# Patient Record
Sex: FEMALE | Race: WHITE
[De-identification: names, ages, dates, MRNs, and addresses within clinical notes are randomized per-mention and may not be internally consistent; named-entity substitution may affect disease eponyms.]

---

## 2017-06-26 ENCOUNTER — HOSPITAL ENCOUNTER (OUTPATIENT)
Dept: HOSPITAL 82 - ED | Age: 67
Setting detail: OBSERVATION
LOS: 1 days | Discharge: HOME | End: 2017-06-27
Attending: INTERNAL MEDICINE | Admitting: INTERNAL MEDICINE
Payer: MEDICARE

## 2017-06-26 VITALS — WEIGHT: 240.94 LBS | BODY MASS INDEX: 45.49 KG/M2 | HEIGHT: 61 IN

## 2017-06-26 DIAGNOSIS — Z87.891: ICD-10-CM

## 2017-06-26 DIAGNOSIS — E78.5: ICD-10-CM

## 2017-06-26 DIAGNOSIS — Z79.84: ICD-10-CM

## 2017-06-26 DIAGNOSIS — N39.0: Primary | ICD-10-CM

## 2017-06-26 DIAGNOSIS — I10: ICD-10-CM

## 2017-06-26 DIAGNOSIS — N17.9: ICD-10-CM

## 2017-06-26 DIAGNOSIS — E11.9: ICD-10-CM

## 2017-06-26 DIAGNOSIS — E86.0: ICD-10-CM

## 2017-06-26 LAB
ALBUMIN SERPL-MCNC: 4.7 G/DL (ref 3.2–5)
ALP SERPL-CCNC: 92 U/L (ref 38–126)
ALT SERPL-CCNC: 30 U/L (ref 11–66)
ANION GAP SERPL CALCULATED.3IONS-SCNC: 20 MMOL/L
AST SERPL-CCNC: 20 U/L (ref 9–36)
BACTERIA #/AREA URNS HPF: (no result) HPF
BASOPHILS NFR BLD AUTO: 0 % (ref 0–3)
BILIRUB UR QL STRIP.AUTO: NEGATIVE
BUN SERPL-MCNC: 43 MG/DL (ref 8–23)
BUN/CREAT SERPL: 32
CALCIUM SERPL-MCNC: 9.7 MG/DL (ref 8.4–10.2)
CHLORIDE SERPL-SCNC: 105 MMOL/L (ref 95–108)
CLARITY UR: (no result)
CO2 SERPL-SCNC: 24 MMOL/L (ref 22–30)
COLOR UR AUTO: YELLOW
CREAT SERPL-MCNC: 1.3 MG/DL (ref 0.5–1)
EOSINOPHIL NFR BLD AUTO: 2 % (ref 0–8)
ERYTHROCYTE [DISTWIDTH] IN BLOOD BY AUTOMATED COUNT: 14.7 % (ref 11.5–15.5)
FLUBV AG SPEC QL IA: (no result)
GLUCOSE SERPL-MCNC: 143 MG/DL (ref 82–115)
GLUCOSE UR STRIP.AUTO-MCNC: NEGATIVE MG/DL
HAV IGM SERPL QL IA: (no result)
HCT VFR BLD AUTO: 37.5 % (ref 37–47)
HGB BLD-MCNC: 12.2 G/DL (ref 12–16)
HGB UR QL STRIP.AUTO: (no result)
IMM GRANULOCYTES NFR BLD: 0.6 % (ref 0–1)
KETONES UR STRIP.AUTO-MCNC: NEGATIVE MG/DL
LEUKOCYTE ESTERASE UR QL STRIP.AUTO: (no result)
LYMPHOCYTES NFR BLD: 15 % (ref 15–41)
MCH RBC QN AUTO: 28.6 PG  CALC (ref 26–32)
MCHC RBC AUTO-ENTMCNC: 32.5 G/L CALC (ref 32–36)
MCV RBC AUTO: 87.8 FL  CALC (ref 80–100)
MONOCYTES NFR BLD AUTO: 4 % (ref 2–13)
NEUTROPHILS # BLD AUTO: 14.89 THOU/UL (ref 2–7.15)
NEUTROPHILS NFR BLD AUTO: 79 % (ref 42–76)
NITRITE UR QL STRIP.AUTO: NEGATIVE
PH UR STRIP.AUTO: 5.5 [PH] (ref 4.5–8)
PLATELET # BLD AUTO: 260 THOU/UL (ref 130–400)
POTASSIUM SERPL-SCNC: 4 MMOL/L (ref 3.5–5.1)
PROT SERPL-MCNC: 8.4 G/DL (ref 6.3–8.2)
PROT UR QL STRIP.AUTO: 30 MG/DL
RBC # BLD AUTO: 4.27 MILL/UL (ref 4.2–5.6)
RBC #/AREA URNS HPF: (no result) RBC/HPF (ref 0–5)
SODIUM SERPL-SCNC: 144 MMOL/L (ref 137–146)
SP GR UR STRIP.AUTO: 1.02
SQUAMOUS URNS QL MICRO: (no result) EPI/HPF
UROBILINOGEN UR QL STRIP.AUTO: 0.2 E.U./DL
WBC #/AREA URNS HPF: (no result) WBC/HPF (ref 0–5)

## 2017-06-27 VITALS — SYSTOLIC BLOOD PRESSURE: 151 MMHG | DIASTOLIC BLOOD PRESSURE: 58 MMHG

## 2017-06-27 VITALS — DIASTOLIC BLOOD PRESSURE: 64 MMHG | SYSTOLIC BLOOD PRESSURE: 119 MMHG

## 2017-06-27 VITALS — DIASTOLIC BLOOD PRESSURE: 59 MMHG | SYSTOLIC BLOOD PRESSURE: 126 MMHG

## 2017-06-27 VITALS — SYSTOLIC BLOOD PRESSURE: 144 MMHG | DIASTOLIC BLOOD PRESSURE: 56 MMHG

## 2017-06-27 LAB
ANION GAP SERPL CALCULATED.3IONS-SCNC: 18 MMOL/L
BARBITURATES UR-MCNC: NEGATIVE UG/ML
BASOPHILS NFR BLD AUTO: 0 % (ref 0–3)
BUN SERPL-MCNC: 32 MG/DL (ref 8–23)
BUN/CREAT SERPL: 29
CALCIUM SERPL-MCNC: 8.7 MG/DL (ref 8.4–10.2)
CHLORIDE SERPL-SCNC: 106 MMOL/L (ref 95–108)
CO2 SERPL-SCNC: 22 MMOL/L (ref 22–30)
COCAINE UR-MCNC: NEGATIVE NG/ML
CREAT SERPL-MCNC: 1.1 MG/DL (ref 0.5–1)
EOSINOPHIL NFR BLD AUTO: 1 % (ref 0–8)
ERYTHROCYTE [DISTWIDTH] IN BLOOD BY AUTOMATED COUNT: 14.6 % (ref 11.5–15.5)
GLUCOSE SERPL-MCNC: 208 MG/DL (ref 82–115)
HCT VFR BLD AUTO: 31.9 % (ref 37–47)
HGB BLD-MCNC: 10.4 G/DL (ref 12–16)
IMM GRANULOCYTES NFR BLD: 0.6 % (ref 0–1)
LYMPHOCYTES NFR BLD: 14 % (ref 15–41)
MCH RBC QN AUTO: 28.5 PG  CALC (ref 26–32)
MCHC RBC AUTO-ENTMCNC: 32.6 G/L CALC (ref 32–36)
MCV RBC AUTO: 87.4 FL  CALC (ref 80–100)
METHADONE SERPL-MCNC: NEGATIVE NG/ML
MONOCYTES NFR BLD AUTO: 4 % (ref 2–13)
NEUTROPHILS # BLD AUTO: 15.72 THOU/UL (ref 2–7.15)
NEUTROPHILS NFR BLD AUTO: 81 % (ref 42–76)
OXCYCODONE: NEGATIVE
PLATELET # BLD AUTO: 222 THOU/UL (ref 130–400)
POTASSIUM SERPL-SCNC: 4.3 MMOL/L (ref 3.5–5.1)
RBC # BLD AUTO: 3.65 MILL/UL (ref 4.2–5.6)
SODIUM SERPL-SCNC: 141 MMOL/L (ref 137–146)
TETRAHYDROCANNABIONOL: NEGATIVE
TRICYLIC ANTIDEPRESSANTS: NEGATIVE

## 2019-01-12 ENCOUNTER — HOSPITAL ENCOUNTER (OUTPATIENT)
Dept: HOSPITAL 82 - ED | Age: 69
Setting detail: OBSERVATION
LOS: 1 days | Discharge: HOME | End: 2019-01-13
Attending: INTERNAL MEDICINE | Admitting: INTERNAL MEDICINE
Payer: MEDICARE

## 2019-01-12 VITALS — DIASTOLIC BLOOD PRESSURE: 56 MMHG | SYSTOLIC BLOOD PRESSURE: 150 MMHG

## 2019-01-12 VITALS — SYSTOLIC BLOOD PRESSURE: 158 MMHG | DIASTOLIC BLOOD PRESSURE: 49 MMHG

## 2019-01-12 VITALS — WEIGHT: 240.3 LBS | HEIGHT: 63 IN | BODY MASS INDEX: 42.58 KG/M2

## 2019-01-12 VITALS — DIASTOLIC BLOOD PRESSURE: 57 MMHG | SYSTOLIC BLOOD PRESSURE: 137 MMHG

## 2019-01-12 DIAGNOSIS — Z79.84: ICD-10-CM

## 2019-01-12 DIAGNOSIS — E78.5: ICD-10-CM

## 2019-01-12 DIAGNOSIS — E66.9: ICD-10-CM

## 2019-01-12 DIAGNOSIS — E11.22: ICD-10-CM

## 2019-01-12 DIAGNOSIS — N39.0: ICD-10-CM

## 2019-01-12 DIAGNOSIS — I12.9: ICD-10-CM

## 2019-01-12 DIAGNOSIS — N18.3: ICD-10-CM

## 2019-01-12 DIAGNOSIS — Z87.891: ICD-10-CM

## 2019-01-12 DIAGNOSIS — R07.89: Primary | ICD-10-CM

## 2019-01-12 LAB
ANION GAP SERPL CALCULATED.3IONS-SCNC: 18 MMOL/L
BACTERIA #/AREA URNS HPF: (no result) HPF
BASOPHILS NFR BLD AUTO: 0 % (ref 0–3)
BILIRUB UR QL STRIP.AUTO: NEGATIVE
BUN SERPL-MCNC: 34 MG/DL (ref 8–23)
BUN/CREAT SERPL: 28
CHLORIDE SERPL-SCNC: 105 MMOL/L (ref 95–108)
CHOLEST SERPL-MCNC: 200 MG/DL (ref 0–199)
CHOLEST/HDLC SERPL: 5.5 {RATIO}
CLARITY UR: (no result)
CO2 SERPL-SCNC: 22 MMOL/L (ref 22–30)
COLOR UR AUTO: YELLOW
CREAT SERPL-MCNC: 1.2 MG/DL (ref 0.5–1)
EOSINOPHIL NFR BLD AUTO: 2 % (ref 0–8)
ERYTHROCYTE [DISTWIDTH] IN BLOOD BY AUTOMATED COUNT: 14.1 % (ref 11.5–15.5)
GLUCOSE UR STRIP.AUTO-MCNC: NEGATIVE MG/DL
HCT VFR BLD AUTO: 36.9 % (ref 37–47)
HDLC SERPL-MCNC: 37 MG/DL (ref 40–?)
HGB BLD-MCNC: 12 G/DL (ref 12–16)
HGB UR QL STRIP.AUTO: (no result)
IMM GRANULOCYTES NFR BLD: 0.5 % (ref 0–5)
KETONES UR STRIP.AUTO-MCNC: NEGATIVE MG/DL
LDLC SERPL CALC-MCNC: 110 MG/DL
LEUKOCYTE ESTERASE UR QL STRIP.AUTO: (no result)
LYMPHOCYTES NFR BLD: 25 % (ref 15–41)
MAGNESIUM SERPL-MCNC: 1.7 MG/DL (ref 1.6–2.3)
MCH RBC QN AUTO: 29.5 PG  CALC (ref 26–32)
MCHC RBC AUTO-ENTMCNC: 32.5 G/L CALC (ref 32–36)
MCV RBC AUTO: 90.7 FL  CALC (ref 80–100)
MONOCYTES NFR BLD AUTO: 6 % (ref 2–13)
NEUTROPHILS # BLD AUTO: 8.34 THOU/UL (ref 2–7.15)
NEUTROPHILS NFR BLD AUTO: 67 % (ref 42–76)
NITRITE UR QL STRIP.AUTO: NEGATIVE
PH UR STRIP.AUTO: 5.5 [PH] (ref 4.5–8)
PLATELET # BLD AUTO: 269 THOU/UL (ref 130–400)
POTASSIUM SERPL-SCNC: 4.1 MMOL/L (ref 3.5–5.1)
PROT UR STRIP.AUTO-MCNC: (no result) MG/DL
RBC # BLD AUTO: 4.07 MILL/UL (ref 4.2–5.6)
RBC #/AREA URNS HPF: (no result) RBC/HPF (ref 0–5)
SODIUM SERPL-SCNC: 141 MMOL/L (ref 137–146)
SP GR UR STRIP.AUTO: <=1.005
SQUAMOUS URNS QL MICRO: (no result) EPI/HPF
TRIGL SERPL-MCNC: 266 MG/DL (ref 30–149)
UROBILINOGEN UR QL STRIP.AUTO: 0.2 E.U./DL
VLDLC SERPL CALC-MCNC: 53 MG/DL

## 2019-01-12 NOTE — NUR
ASSESSMENT IS COMPLETED:  IV SITE IS FREE FROM REDNESS OR EDEMA. HR IS REG,
PULSES ARE STRONG X4, ABD IS SOFT WITH ACTIVE BS. BREATH SOUNDS ARE CLEAR,
BILATERALLY, NO C/O SOB, TELE MONITOR IN PLACE. CONTINUE TO OSBERVE AND
MONITOR,

## 2019-01-12 NOTE — NUR
Admission Note
 
 
Report Given to:         DOMINICK LPN
Transported by:           Wheelchair          X Stretcher
 
Transported with:        X Nurse     Transporter   X Patent IV    O2
                         X Cardiac Monitor
 
 
 
 
          
 
         [place]SP[place] [facility] [place]

## 2019-01-12 NOTE — NUR
PT SITTING UP IN RECLINER VISITING WITH FAMILIES; AGREED TO TAKE LOVENOX SHOT;
TOLERATED WELL; RESP EVEN AND UNLABRED; TELE IN PLACE; PT AMBULATORY IN ROOM;
SAFETY PRECAUTION REINFORCE; CALL MELARA IN REACH.

## 2019-01-12 NOTE — NUR
PT STATES THAT SHE HAS OFF AND ON CHEST DISCOMFORT THE PAST WEEK AFTER STARTING
A NEW MEDICATION- JANUVIA. PT DENIES ANY C/P AT THIS TIME. PT IS AOX4. PT
DENIES ANY SOB, N/V OR WEAKNESS. 
 
PT STATES DISCOMFORT COMES ON IN THE MORNING AND IS GONE IN THE AFTERNOON, HAS
REOCCURED FOR THE PAST WEEK.

## 2019-01-12 NOTE — NUR
PT ARRIEVED ON FLOOR VIA W/C ACCOMPANIED BY ERICKA RN ER STAFF; PT AMBULATED
TO DIGITAL STANDING SCALE WITH STEADY GAIT; PT VOIDED CLEAR, YELLOW URINE; PT
ORIENT TO ROOM AND CALL MELARA SYSTEM; PT REQUEST RECLINER WHICH WAS PROVIDED;
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
PLACE; CALL BELL IN REACH.

## 2019-01-13 VITALS — DIASTOLIC BLOOD PRESSURE: 57 MMHG | SYSTOLIC BLOOD PRESSURE: 100 MMHG

## 2019-01-13 VITALS — SYSTOLIC BLOOD PRESSURE: 133 MMHG | DIASTOLIC BLOOD PRESSURE: 58 MMHG

## 2019-01-13 VITALS — SYSTOLIC BLOOD PRESSURE: 106 MMHG | DIASTOLIC BLOOD PRESSURE: 42 MMHG

## 2019-01-13 VITALS — DIASTOLIC BLOOD PRESSURE: 65 MMHG | SYSTOLIC BLOOD PRESSURE: 143 MMHG

## 2019-01-13 LAB
ALBUMIN SERPL-MCNC: 3.5 G/DL (ref 3.2–5)
ALP SERPL-CCNC: 69 U/L (ref 38–126)
AMYLASE SERPL-CCNC: 89 U/L (ref 30–110)
ANION GAP SERPL CALCULATED.3IONS-SCNC: 15 MMOL/L
AST SERPL-CCNC: 21 U/L (ref 9–36)
BASOPHILS NFR BLD AUTO: 1 % (ref 0–3)
BUN SERPL-MCNC: 33 MG/DL (ref 8–23)
BUN/CREAT SERPL: 25
CHLORIDE SERPL-SCNC: 109 MMOL/L (ref 95–108)
CO2 SERPL-SCNC: 21 MMOL/L (ref 22–30)
CREAT SERPL-MCNC: 1.3 MG/DL (ref 0.5–1)
EOSINOPHIL NFR BLD AUTO: 2 % (ref 0–8)
ERYTHROCYTE [DISTWIDTH] IN BLOOD BY AUTOMATED COUNT: 14 % (ref 11.5–15.5)
HCT VFR BLD AUTO: 32.6 % (ref 37–47)
HGB BLD-MCNC: 10.4 G/DL (ref 12–16)
IMM GRANULOCYTES NFR BLD: 0.3 % (ref 0–5)
LIPASE SERPL-CCNC: 272 U/L (ref 23–300)
LYMPHOCYTES NFR BLD: 24 % (ref 15–41)
MAGNESIUM SERPL-MCNC: 1.7 MG/DL (ref 1.6–2.3)
MCH RBC QN AUTO: 28.7 PG  CALC (ref 26–32)
MCHC RBC AUTO-ENTMCNC: 31.9 G/L CALC (ref 32–36)
MCV RBC AUTO: 90.1 FL  CALC (ref 80–100)
MONOCYTES NFR BLD AUTO: 7 % (ref 2–13)
NEUTROPHILS # BLD AUTO: 5.71 THOU/UL (ref 2–7.15)
NEUTROPHILS NFR BLD AUTO: 66 % (ref 42–76)
PLATELET # BLD AUTO: 204 THOU/UL (ref 130–400)
POTASSIUM SERPL-SCNC: 4.3 MMOL/L (ref 3.5–5.1)
PROT SERPL-MCNC: 6.5 G/DL (ref 6.3–8.2)
RBC # BLD AUTO: 3.62 MILL/UL (ref 4.2–5.6)
SODIUM SERPL-SCNC: 142 MMOL/L (ref 137–146)

## 2019-01-13 NOTE — NUR
PT HAS BEEN RESTING IN BED WITH NO DISTRESS NOTED IV SITE IS FREE FROM REDNESS
OR EDEMA. TELE MONITOR IN PLACE CONTINUE TO OBSERVE AND MONITOR

## 2019-01-13 NOTE — NUR
Discharge instructions given. Patient verbalizes understanding of same.
Discharged in stable condition via Wheelchair to Home with
*Other. All belongings sent with pt.
Pt wheelchaired to lobby accompanied by mel french;pt to drive self home.

## 2019-01-13 NOTE — NUR
PT OOB RESTING IN RECLINER;RESPIRATIONS EVEN AND UNLABORED ON RA;PT CONTINUES
TO DENY ANY CHEST PAIN;TELE MONITORING IN PLACE;IV SITE TO LAC REMAINS
PATENT;ACCUCHECK 142, NO COVERAGE NEEDED AT THIS TIME;ASSESSMENT REMAINS
UNCHANGED;ENCOURAGED TO CALL FOR ASSISTANCE IF NEEDED;CALL LIGHT IN
REACH;;WILL CONTINUE TO MONITOR

## 2019-01-13 NOTE — NUR
PT IS RESTING IN BED WITH NO DISTRESS NOTED. IV SITE IS FREE FROM REDNESS OR
EDEMA. CONTINUE TO OSBERVE AND MONITOR.

## 2019-01-13 NOTE — NUR
REPORT RECEIVED FROM ADONIS BLACK;PT OOB RESTING IN RECLINER;INTRODUCED SELF TO PT
AND POC DISCUSSED;PT DENIES ANY CURRENT PAIN OR NEEDS,PAIN SCALE AND REPORTING
EDUCATED;RESPIRATIONS EVEN AND UNLABORED AT THIS TIME;TELE MONITORING IN
PLACE;PT ENCOURAGED TO CALL FOR ASSISTANCE IF NEEDED;FALL PRECAUTIONS IN PLACE
WITH CALL LIGHT IN REACH;WILL CONTINUE TO MONITOR

## 2019-01-13 NOTE — NUR
INQUIRED WITH PT RE: QUESTION WITH MRSA. STATES" WHEN I WENT TO  AT [facility], BOIL ON THE NECK THAT WAS NOT TESTED , SHE TOLD ME I HAD MRSA".

## 2019-01-13 NOTE — NUR
ALL DISCHARGE INSTRUCTIONS PROVIDED AT THIS TIME;PRESCRIPTIONS FOR KEFLEX AND
LIPITOR DISCUSSED IN DEPTH AND PROVIDED FOR DISCHARGE;ALL QUESTIONS ANSWERED
AT THIS TIME;IV SITE REMOVED WITH CATHETER INTACT;PT DENIES ANY ADDITIONAL
NEEDS AT THIS TIME;WHEELCHAIR TO BE PROVIDED FOR DISCHARGE HOME.

## 2019-01-13 NOTE — NUR
PT OOB RESTING IN RECLINER;VS OBTAINED AND ASSESSMENT COMPLETED;PT DENIES ANY
CURRENT CHEST PAIN OR DISCOMFORTS,PAIN SCALE AND REPORTING
EDUCATED;RESPIRATIONS EVEN AND UNLABORED ON RA,CLEAR LUNG SOUNDS NOTED;ABDOMEN
SOFT ON PALPATION AND ACTIVE IN ALL 4 QUADRANTS;STRONG PEDAL PULSES;SKIN
INTACT;TELE MONITORING IN PLACE;#20G TO LAC FLUSHED AND PATENT,SITE APPEARS
HEALTHY;ACCUCHECK 138,NO COVERAGE NEEDED THIS MORNING;PT DENIES ANY ADDITIONAL
NEEDS AT THIS TIME AND IS ENCOURAGED TO CALL FOR ASSISTANCE IF NEEDED;FALL
PRECAUTIONS IN PLACE WITH CALL LIGHT IN REACH;WILL CONTINUE TO MONITOR

## 2021-06-03 ENCOUNTER — HOSPITAL ENCOUNTER (EMERGENCY)
Dept: HOSPITAL 82 - ED | Age: 71
Discharge: HOME | End: 2021-06-03
Payer: MEDICARE

## 2021-06-03 VITALS — WEIGHT: 242.51 LBS | HEIGHT: 63 IN | BODY MASS INDEX: 42.97 KG/M2

## 2021-06-03 VITALS — DIASTOLIC BLOOD PRESSURE: 53 MMHG | SYSTOLIC BLOOD PRESSURE: 136 MMHG

## 2021-06-03 DIAGNOSIS — S00.31XA: ICD-10-CM

## 2021-06-03 DIAGNOSIS — E11.9: ICD-10-CM

## 2021-06-03 DIAGNOSIS — Z79.82: ICD-10-CM

## 2021-06-03 DIAGNOSIS — S00.81XA: Primary | ICD-10-CM

## 2021-06-03 DIAGNOSIS — M25.512: ICD-10-CM

## 2021-06-03 DIAGNOSIS — E78.00: ICD-10-CM
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
## 2021-06-03 DIAGNOSIS — I10: ICD-10-CM

## 2021-06-03 DIAGNOSIS — Z79.84: ICD-10-CM

## 2021-10-08 ENCOUNTER — HOSPITAL ENCOUNTER (OUTPATIENT)
Dept: HOSPITAL 82 - ED | Age: 71
Setting detail: OBSERVATION
LOS: 2 days | Discharge: HOME | End: 2021-10-10
Attending: INTERNAL MEDICINE | Admitting: INTERNAL MEDICINE
Payer: MEDICARE

## 2021-10-08 VITALS — DIASTOLIC BLOOD PRESSURE: 87 MMHG | SYSTOLIC BLOOD PRESSURE: 162 MMHG

## 2021-10-08 VITALS — WEIGHT: 165.35 LBS | BODY MASS INDEX: 29.3 KG/M2 | HEIGHT: 63 IN

## 2021-10-08 VITALS — DIASTOLIC BLOOD PRESSURE: 67 MMHG | SYSTOLIC BLOOD PRESSURE: 131 MMHG

## 2021-10-08 DIAGNOSIS — Z87.891: ICD-10-CM

## 2021-10-08 DIAGNOSIS — E11.9: ICD-10-CM

## 2021-10-08 DIAGNOSIS — N17.9: ICD-10-CM

## 2021-10-08 DIAGNOSIS — E78.5: ICD-10-CM

## 2021-10-08 DIAGNOSIS — N12: Primary | ICD-10-CM

## 2021-10-08 DIAGNOSIS — E86.0: ICD-10-CM

## 2021-10-08 DIAGNOSIS — Z20.822: ICD-10-CM

## 2021-10-08 DIAGNOSIS — Z79.84: ICD-10-CM

## 2021-10-08 DIAGNOSIS — I10: ICD-10-CM

## 2021-10-08 LAB
ALBUMIN SERPL-MCNC: 4.5 G/DL (ref 3.2–5)
ALP SERPL-CCNC: 93 U/L (ref 38–126)
ANION GAP SERPL CALCULATED.3IONS-SCNC: 20 MMOL/L
AST SERPL-CCNC: 26 U/L (ref 9–36)
BASOPHILS NFR BLD AUTO: 0 % (ref 0–3)
BILIRUB UR QL STRIP.AUTO: NEGATIVE
BUN SERPL-MCNC: 47 MG/DL (ref 8–23)
BUN/CREAT SERPL: 28
CHLORIDE SERPL-SCNC: 108 MMOL/L (ref 95–108)
CO2 SERPL-SCNC: 17 MMOL/L (ref 22–30)
COLOR UR AUTO: YELLOW
CREAT SERPL-MCNC: 1.7 MG/DL (ref 0.5–1)
EOSINOPHIL NFR BLD AUTO: 2 % (ref 0–8)
ERYTHROCYTE [DISTWIDTH] IN BLOOD BY AUTOMATED COUNT: 14.7 % (ref 11.5–15.5)
GLUCOSE UR STRIP.AUTO-MCNC: NEGATIVE MG/DL
HCT VFR BLD AUTO: 32.7 % (ref 37–47)
HGB BLD-MCNC: 10.1 G/DL (ref 12–16)
HGB UR QL STRIP.AUTO: (no result)
IMM GRANULOCYTES NFR BLD: 0.3 % (ref 0–5)
KETONES UR STRIP.AUTO-MCNC: NEGATIVE MG/DL
LEUKOCYTE ESTERASE UR QL STRIP.AUTO: (no result)
LYMPHOCYTES NFR BLD: 24 % (ref 15–41)
MCH RBC QN AUTO: 28 PG  CALC (ref 26–32)
MCHC RBC AUTO-ENTMCNC: 30.9 G/DL CAL (ref 32–36)
MCV RBC AUTO: 90.6 FL  CALC (ref 80–100)
MONOCYTES NFR BLD AUTO: 5 % (ref 2–13)
NEUTROPHILS # BLD AUTO: 9.76 THOU/UL (ref 2–7.15)
NEUTROPHILS NFR BLD AUTO: 68 % (ref 42–76)
NITRITE UR QL STRIP.AUTO: NEGATIVE
PH UR STRIP.AUTO: 5.5 [PH] (ref 4.5–8)
PLATELET # BLD AUTO: 293 THOU/UL (ref 130–400)
POTASSIUM SERPL-SCNC: 4.4 MMOL/L (ref 3.5–5.1)
PROT SERPL-MCNC: 8.8 G/DL (ref 6.3–8.2)
PROT UR QL STRIP.AUTO: 100 MG/DL
RBC # BLD AUTO: 3.61 MILL/UL (ref 4.2–5.6)
RBC #/AREA URNS HPF: (no result) RBC/HPF (ref 0–5)
SODIUM SERPL-SCNC: 141 MMOL/L (ref 137–146)
SP GR UR STRIP.AUTO: 1.02
SQUAMOUS URNS QL MICRO: (no result) EPI/HPF
UROBILINOGEN UR QL STRIP.AUTO: 0.2 E.U./DL
WBC #/AREA URNS HPF: >100 WBC/HPF (ref 0–5)

## 2021-10-08 NOTE — NUR
PT ARRIVED TO [facility] VIA WHEELCHAIR ACCOMPANIED BY ER NURSE, PT AMBULATED WITH
STEADY GAIT TO RECLINER, DINNER TRAY PROVIDED. ORIENTED PT TO ROOM AND CALL
LIGHT, DISCUSSED POC. IV SITE TO RAC SL FLUSHED WELL. NOTED +2 PITTING EDEMA
TO BLE, ENCOURAGED ELEVATION. SKIN INTACT, ADMISSION ASSESSMENT COMPLETED, PT
ON RA, VOICES NO NEEDS OR COMPLAINTS AT THIS TIME. CALL LIGHT IN REACH,
CONTINUE TO MONITOR.

## 2021-10-08 NOTE — NUR
Admission Note
 
Report Given to:         ADAMA HONEYCUTT
Transported by:          X Wheelchair           Stretcher
 
Transported with:        X Nurse     Transporter   X Patent IV    O2
                          Cardiac Monitor
 
Location:                 ICU      X [facility]

## 2021-10-09 VITALS — SYSTOLIC BLOOD PRESSURE: 140 MMHG | DIASTOLIC BLOOD PRESSURE: 56 MMHG

## 2021-10-09 VITALS — SYSTOLIC BLOOD PRESSURE: 140 MMHG | DIASTOLIC BLOOD PRESSURE: 63 MMHG

## 2021-10-09 VITALS — SYSTOLIC BLOOD PRESSURE: 122 MMHG | DIASTOLIC BLOOD PRESSURE: 72 MMHG

## 2021-10-09 VITALS — DIASTOLIC BLOOD PRESSURE: 70 MMHG | SYSTOLIC BLOOD PRESSURE: 118 MMHG

## 2021-10-09 VITALS — SYSTOLIC BLOOD PRESSURE: 148 MMHG | DIASTOLIC BLOOD PRESSURE: 72 MMHG

## 2021-10-09 VITALS — SYSTOLIC BLOOD PRESSURE: 119 MMHG | DIASTOLIC BLOOD PRESSURE: 56 MMHG

## 2021-10-09 LAB
ALBUMIN SERPL-MCNC: 3.5 G/DL (ref 3.2–5)
ALP SERPL-CCNC: 76 U/L (ref 38–126)
ANION GAP SERPL CALCULATED.3IONS-SCNC: 14 MMOL/L
AST SERPL-CCNC: 18 U/L (ref 9–36)
BASOPHILS NFR BLD AUTO: 0 % (ref 0–3)
BUN SERPL-MCNC: 41 MG/DL (ref 8–23)
BUN/CREAT SERPL: 29
CHLORIDE SERPL-SCNC: 111 MMOL/L (ref 95–108)
CO2 SERPL-SCNC: 20 MMOL/L (ref 22–30)
CREAT SERPL-MCNC: 1.4 MG/DL (ref 0.5–1)
EOSINOPHIL NFR BLD AUTO: 2 % (ref 0–8)
ERYTHROCYTE [DISTWIDTH] IN BLOOD BY AUTOMATED COUNT: 14.8 % (ref 11.5–15.5)
HCT VFR BLD AUTO: 28.4 % (ref 37–47)
HGB BLD-MCNC: 8.8 G/DL (ref 12–16)
IMM GRANULOCYTES NFR BLD: 0.7 % (ref 0–5)
LYMPHOCYTES NFR BLD: 18 % (ref 15–41)
MCH RBC QN AUTO: 28.1 PG  CALC (ref 26–32)
MCHC RBC AUTO-ENTMCNC: 31 G/DL CAL (ref 32–36)
MCV RBC AUTO: 90.7 FL  CALC (ref 80–100)
MONOCYTES NFR BLD AUTO: 7 % (ref 2–13)
NEUTROPHILS # BLD AUTO: 7.62 THOU/UL (ref 2–7.15)
NEUTROPHILS NFR BLD AUTO: 72 % (ref 42–76)
PLATELET # BLD AUTO: 260 THOU/UL (ref 130–400)
POTASSIUM SERPL-SCNC: 4.6 MMOL/L (ref 3.5–5.1)
PROT SERPL-MCNC: 6.7 G/DL (ref 6.3–8.2)
RBC # BLD AUTO: 3.13 MILL/UL (ref 4.2–5.6)
SODIUM SERPL-SCNC: 141 MMOL/L (ref 137–146)

## 2021-10-09 NOTE — NUR
PT IN RECLINER, NO SIGNS OF DISTRESS NOTED, RESP EVEN AND UNLABORED. PT VOICES
NO NEEDS OR COMPLAINTS, CALL LIGHT IN REACH,CONTINUE TO MONITOR.

## 2021-10-09 NOTE — NUR
PATIENT SITTING UP IN CHAIR AT THIS TIME. PATIENT DENIES ANY PAIN OR NEEDS
CALL LIGHT IS WITHIN REACH ACCU CHECK RESULTED  AND NO SLIDING SCALE
NEEDED AT THIS TIME. WILL CONTINUE TO MONITOR.

## 2021-10-09 NOTE — NUR
PATIENT REMAINS UP IN CHAIR AT THIS TIME. PATIENT DEINES ANY NEEDS AND OR PAIN
AT THIS TIME. CALL LIGHT IS WITHIN REACH. PATIENT CONTINUES TO USE BEDSIDE
POTTY AT THIS TIME. EDEMA REMAINS PRESENT IN BILATERAL LOWER LEGS. WILL
CONTINUE TO MONITOR.

## 2021-10-09 NOTE — NUR
PATIENT SITTING UP IN CHAIR AT THIS TIME PATIENT DENIES ANY SHORTNESS OF
BREATH AND IS STATING ON SPO2 AT 98% ROOM AIR. LUNG LUCERO ARE CLEAR AT THIS
TIME. PATIENT DOES EXHIBIT BILATERAL LOWER LEG EDEMA 2+ BUT STATES THIS IS
"NORMAL FOR HER" PATIENT ACCU CHECK SHOWS READING  AND NO SLIDING SCALE
NECESSARY AT THIS TIME. CALL LIGHT IS WITHIN REACH. RN ASSESSMENT DONE SEE
INTERVENITIONS.

## 2021-10-09 NOTE — NUR
PT SITTING IN RECLINER DRINKING COFFEE, NO SIGNS OF DISTRESS NOTED, RESP EVEN
AND UNLABORED. CALL LIGHT IN REACH,CONTINUE TO MONITOR.

## 2021-10-09 NOTE — NUR
PATIENT UP IN THE ROOM-STEADY ON HER FEET AT THIS TIME. AWAKE ALERT AND
ORIENTEDX3. IVF NS PATENT AND INFUSING VIA LEFT HAND SITE AT 125CC/HR. SITE IS
HEALTHY AT THIS TIME. PATIENT WITH NO COMPLAINTS AT THIS TIME-HOPING TO GO
HOME TOMORROW. LUNGS ARE CLEAR. ABD IS SOFT WITH ACTIVE BS. BLE SWELLING IS
NOTED. ENCOURAGED PATIENT TO ELEVATE FEET WHEN POSSIBLE. PATIENT DENIES ANY
DIFFICULTY WITH URINATION AND STATES THAT SHE DID HAVE BM TODAY. SAFETY
PRECAUTIONS REINFORCED. CALL LIGHT IN REACH. WILL CONT TO MONITOR.

## 2021-10-09 NOTE — NUR
PT RESTING IN RECLINER AT BEDSIDE, REQUESTING WARM BLANKET, NO SIGNS OF
DISTRESS NOTED, RESP EVEN AND UNLABORED. BLANKET PROVIDED, PT VOICES NO OTHER
NEEDS OR COMPLAINTS AT THIS TIME, CALL LIGHT IN REACH,CONTINUE TO MONITOR.

## 2021-10-10 VITALS — DIASTOLIC BLOOD PRESSURE: 70 MMHG | SYSTOLIC BLOOD PRESSURE: 158 MMHG

## 2021-10-10 VITALS — DIASTOLIC BLOOD PRESSURE: 72 MMHG | SYSTOLIC BLOOD PRESSURE: 158 MMHG

## 2021-10-10 LAB
ALBUMIN SERPL-MCNC: 3.7 G/DL (ref 3.2–5)
ALP SERPL-CCNC: 82 U/L (ref 38–126)
ANION GAP SERPL CALCULATED.3IONS-SCNC: 16 MMOL/L
AST SERPL-CCNC: 20 U/L (ref 9–36)
BASOPHILS NFR BLD AUTO: 0 % (ref 0–3)
BUN SERPL-MCNC: 32 MG/DL (ref 8–23)
BUN/CREAT SERPL: 23
CHLORIDE SERPL-SCNC: 113 MMOL/L (ref 95–108)
CO2 SERPL-SCNC: 19 MMOL/L (ref 22–30)
CREAT SERPL-MCNC: 1.4 MG/DL (ref 0.5–1)
EOSINOPHIL NFR BLD AUTO: 3 % (ref 0–8)
ERYTHROCYTE [DISTWIDTH] IN BLOOD BY AUTOMATED COUNT: 14.9 % (ref 11.5–15.5)
HCT VFR BLD AUTO: 29.5 % (ref 37–47)
HGB BLD-MCNC: 9.1 G/DL (ref 12–16)
IMM GRANULOCYTES NFR BLD: 0.8 % (ref 0–5)
LYMPHOCYTES NFR BLD: 22 % (ref 15–41)
MCH RBC QN AUTO: 28.1 PG  CALC (ref 26–32)
MCHC RBC AUTO-ENTMCNC: 30.8 G/DL CAL (ref 32–36)
MCV RBC AUTO: 91 FL  CALC (ref 80–100)
MONOCYTES NFR BLD AUTO: 6 % (ref 2–13)
NEUTROPHILS # BLD AUTO: 6.55 THOU/UL (ref 2–7.15)
NEUTROPHILS NFR BLD AUTO: 68 % (ref 42–76)
PLATELET # BLD AUTO: 237 THOU/UL (ref 130–400)
POTASSIUM SERPL-SCNC: 4.4 MMOL/L (ref 3.5–5.1)
PROT SERPL-MCNC: 6.9 G/DL (ref 6.3–8.2)
RBC # BLD AUTO: 3.24 MILL/UL (ref 4.2–5.6)
SODIUM SERPL-SCNC: 143 MMOL/L (ref 137–146)

## 2021-10-10 NOTE — NUR
PATIENT UP TO THE BSC TO VOID YELLOW URINE AND THEN BACK TO BED. NEW BAG OF
IVF HUNG AND INFUSING AT 125CC/HR. ENCOURAGED PO FLUIDS INCLUDING CRANBERRY
JUICE. SAFETY PRECAUTIONS REINFORCED. CALL LIGHT IN REACH. WILL CONT TO
MONITOR.

## 2021-10-10 NOTE — NUR
PATIENT RESTING IN BED AT THIS TIME-EYES ARE CLOSED AND RESPS ARE EVEN AND
UNLABORED. IVF NS PATENT AND INFUSING VIA LEFT HAND SITE AT 125CC/HR. CALL
LIGHT IN REACH. WILL CONT TO MONITOR.

## 2021-10-10 NOTE — NUR
PATIENT REFUSE TO TAKE HER LIPTOR AND METOPROLOL AT THIS TIME. PATIENT STATES
THAT SHE TAKES THEM AT NIGHT AND WILL NOT TAKE THIS IN AM. THIS NURSE CALL
PHARMACIST JOVAN TO RESCHEDULE MEDICATION FOR NIGHT DOSING.

## 2021-10-10 NOTE — NUR
PATIENT SITTING UP IN CHAIR AT THIS TIME RN ASSESSMENT DONE SEE INTERVENTIONS
AT THIS TIME. LUNG LUCERO REMAIN CLEAR AND PATIENT DOES PRESENT WITH BILATERAL
LOWER EXT. SWELLING 1-2+ AT THIS TIME. PATIENT DENIES ANY PAIN AT THIS TIME
AND IS REQUESTING NOT TO TAKE HER METOROLOL THIS MORNING SHE STATES SHE TAKES
IT AT NIGHT AND DOES NOT WANT TO TAKE IT NOW. PATIENT STATES SHE WILL RESUME
IT TONIGHT WHEN SHE GOES HOME. CALL LIGHT IS WITHIN REACH WILL CONTINUE TO
MONITOR PATEINT IS ALERT AND ORIENTED X4

## 2021-10-10 NOTE — NUR
Discharge instructions given. Patient verbalizes understanding of same.
Discharged in stable condition via Wheelchair to Home with
*Other. All belongings sent with pt.
PATIENT VERBALIZES ALL D/C INSTRUCTIONS AT THIS TIME.